# Patient Record
Sex: FEMALE | Race: WHITE
[De-identification: names, ages, dates, MRNs, and addresses within clinical notes are randomized per-mention and may not be internally consistent; named-entity substitution may affect disease eponyms.]

---

## 2019-01-04 ENCOUNTER — HOSPITAL ENCOUNTER (OUTPATIENT)
Dept: HOSPITAL 35 - OR | Age: 34
Discharge: HOME | End: 2019-01-04
Attending: SURGERY
Payer: COMMERCIAL

## 2019-01-04 VITALS — DIASTOLIC BLOOD PRESSURE: 64 MMHG | SYSTOLIC BLOOD PRESSURE: 107 MMHG

## 2019-01-04 VITALS — WEIGHT: 125 LBS | HEIGHT: 62.99 IN | BODY MASS INDEX: 22.15 KG/M2

## 2019-01-04 VITALS — SYSTOLIC BLOOD PRESSURE: 107 MMHG | DIASTOLIC BLOOD PRESSURE: 64 MMHG

## 2019-01-04 DIAGNOSIS — K43.0: Primary | ICD-10-CM

## 2019-01-04 DIAGNOSIS — Z79.899: ICD-10-CM

## 2019-01-04 DIAGNOSIS — Z98.890: ICD-10-CM

## 2019-01-04 DIAGNOSIS — F17.210: ICD-10-CM

## 2019-01-04 DIAGNOSIS — Z88.8: ICD-10-CM

## 2019-01-04 LAB
HCT VFR BLD CALC: 41.2 % (ref 37–47)
HGB BLD-MCNC: 14.5 GM/DL (ref 12–15)

## 2019-01-04 PROCEDURE — 53307: CPT

## 2019-01-04 PROCEDURE — 70005: CPT

## 2019-01-04 PROCEDURE — 50010 RENAL EXPLORATION: CPT

## 2019-01-04 PROCEDURE — 57092: CPT

## 2019-01-04 PROCEDURE — 52265 CYSTOSCOPY AND TREATMENT: CPT

## 2019-01-04 PROCEDURE — 56462: CPT

## 2019-01-04 PROCEDURE — 50558: CPT

## 2019-01-04 PROCEDURE — 56526: CPT

## 2019-01-04 PROCEDURE — 50978: CPT

## 2019-01-04 PROCEDURE — 50984: CPT

## 2019-01-04 PROCEDURE — 54118: CPT

## 2019-01-04 PROCEDURE — 50962: CPT

## 2019-01-04 PROCEDURE — 56525: CPT

## 2019-01-04 PROCEDURE — 50386 REMOVE STENT VIA TRANSURETH: CPT

## 2019-01-04 PROCEDURE — 50101: CPT

## 2019-01-04 PROCEDURE — 50249: CPT

## 2019-01-04 PROCEDURE — 62110: CPT

## 2019-01-04 PROCEDURE — 62900: CPT

## 2019-01-04 PROCEDURE — 54022: CPT

## 2019-01-04 PROCEDURE — 50555 KIDNEY ENDOSCOPY & BIOPSY: CPT

## 2019-01-08 NOTE — PATH
[facility]
[address]
[place]   [address]                     PATHOLOGY RPT PROCEDURE       
_______________________________________________________________________________
 
Name:       GEORGIE CRUZ             Room #:                     DEP [facility] 
M.R.#:      [id number]     Account #:      [id number]  
Admission:  01/04/19    Date of Birth:  [birth year]  
Discharge:  01/04/19                                    Report #:    [id number]
                                                        Path Case #: [id number]
_______________________________________________________________________________
 
[facility] Accession Number: [id number]
.                                                                01
Material submitted:                                        .
INCARCERATED HERNIA CONTENTS
.                                                                01
Clinical history:                                          .
Incisional hernia
.                                                                02
**********************************************************************
Diagnosis:
Incarcerated hernia contents, repair:
- Congested fibrovascular and fibroadipose tissue with reactive changes,
compatible with hernia contents.
(IUV:pit 01/07/2019)
QTP/01/07/2019
**********************************************************************
.                                                                02
Electronically signed:                                     .
Deana Mendoza MD, Pathologist
NPI- [id number]
.                                                                01
Gross description:                                         .
The specimen is received in formalin, labeled "Georgie Cruz, incarcerated
hernia contents" and consists of a segment of gray-tan membranous tissue
with attached yellow adipose tissue measuring 8.8 x 6.0 x 2.0 cm.
Sectioning reveals no nodules or mass lesions and representative sections
are submitted in A1.
(SDY; 1/4/2019)
SYU/SYU
.                                                                02
Pathologist provided ICD-10:
K43.2
.                                                                02
CPT                                                        .
408901
Specimen Comment: A courtesy copy of this report has been sent to
Specimen Comment: [phone], [phone].
Specimen Comment: Report sent to  / DR VARGAS
Specimen Comment: A duplicate report has been generated due to demographic
updates.
***Performed at:  01
   [place] [id number] [address]  [id number]
   MD Kristopher Suarez MD Phone:  [phone]
***Performed at:  02
   [address]  [id number]
 
 
[address]   [address]                     PATHOLOGY RPT PROCEDURE       
_______________________________________________________________________________
 
Name:       GEORGIE CRUZ             Room #:                     DEP [facility] 
M.R.#:      [id number]     Account #:      [id number]  
Admission:  01/04/19    Date of Birth:  [birth year]  
Discharge:  01/04/19                                    Report #:    [id number]
                                                        Path Case #: [id number]
_______________________________________________________________________________
   MD Deana Mendoza MD Phone:  [phone]

## 2020-05-29 ENCOUNTER — HOSPITAL ENCOUNTER (OUTPATIENT)
Dept: HOSPITAL 35 - LAB | Age: 35
End: 2020-05-29
Attending: ANESTHESIOLOGY
Payer: COMMERCIAL

## 2020-05-29 DIAGNOSIS — Z11.59: ICD-10-CM

## 2020-05-29 DIAGNOSIS — Z01.818: Primary | ICD-10-CM

## 2020-07-31 ENCOUNTER — HOSPITAL ENCOUNTER (OUTPATIENT)
Dept: HOSPITAL 35 - LAB | Age: 35
End: 2020-07-31
Attending: ANESTHESIOLOGY
Payer: COMMERCIAL

## 2020-07-31 DIAGNOSIS — Z11.59: ICD-10-CM

## 2020-07-31 DIAGNOSIS — Z01.812: Primary | ICD-10-CM
